# Patient Record
Sex: FEMALE | Race: WHITE | Employment: FULL TIME | URBAN - METROPOLITAN AREA
[De-identification: names, ages, dates, MRNs, and addresses within clinical notes are randomized per-mention and may not be internally consistent; named-entity substitution may affect disease eponyms.]

---

## 2019-06-21 ENCOUNTER — HOSPITAL ENCOUNTER (EMERGENCY)
Age: 19
Discharge: HOME | End: 2019-06-21

## 2019-06-21 ENCOUNTER — APPOINTMENT (OUTPATIENT)
Dept: CT IMAGING | Age: 19
End: 2019-06-21

## 2019-06-21 VITALS
SYSTOLIC BLOOD PRESSURE: 128 MMHG | WEIGHT: 129 LBS | BODY MASS INDEX: 24.35 KG/M2 | DIASTOLIC BLOOD PRESSURE: 74 MMHG | TEMPERATURE: 99.4 F | HEART RATE: 78 BPM | RESPIRATION RATE: 16 BRPM | OXYGEN SATURATION: 99 % | HEIGHT: 61 IN

## 2019-06-21 DIAGNOSIS — S09.90XA INJURY OF HEAD, INITIAL ENCOUNTER: ICD-10-CM

## 2019-06-21 DIAGNOSIS — S01.81XA FOREHEAD LACERATION, INITIAL ENCOUNTER: Primary | ICD-10-CM

## 2019-06-21 PROCEDURE — 6370000000 HC RX 637 (ALT 250 FOR IP): Performed by: PHYSICIAN ASSISTANT

## 2019-06-21 PROCEDURE — 70450 CT HEAD/BRAIN W/O DYE: CPT

## 2019-06-21 PROCEDURE — 99284 EMERGENCY DEPT VISIT MOD MDM: CPT

## 2019-06-21 RX ORDER — ACETAMINOPHEN 325 MG/1
650 TABLET ORAL ONCE
Status: COMPLETED | OUTPATIENT
Start: 2019-06-21 | End: 2019-06-21

## 2019-06-21 RX ADMIN — ACETAMINOPHEN 650 MG: 325 TABLET, FILM COATED ORAL at 19:50

## 2019-06-21 ASSESSMENT — PAIN DESCRIPTION - ORIENTATION: ORIENTATION: LEFT

## 2019-06-21 ASSESSMENT — PAIN SCALES - GENERAL
PAINLEVEL_OUTOF10: 4
PAINLEVEL_OUTOF10: 3

## 2019-06-21 ASSESSMENT — PAIN DESCRIPTION - ONSET: ONSET: ON-GOING

## 2019-06-21 ASSESSMENT — PAIN DESCRIPTION - FREQUENCY: FREQUENCY: INTERMITTENT

## 2019-06-21 ASSESSMENT — PAIN DESCRIPTION - PROGRESSION: CLINICAL_PROGRESSION: NOT CHANGED

## 2019-06-21 ASSESSMENT — PAIN DESCRIPTION - PAIN TYPE: TYPE: ACUTE PAIN

## 2019-06-21 ASSESSMENT — PAIN DESCRIPTION - LOCATION: LOCATION: HEAD

## 2019-06-21 ASSESSMENT — PAIN DESCRIPTION - DESCRIPTORS: DESCRIPTORS: BURNING

## 2019-06-21 NOTE — ED PROVIDER NOTES
Independent:      HPI:  6/21/19, Time: 7:00PM.       Marcos Pollack is a 19 y.o. female presenting to the ED for fall in [facility] when she fell down a hill when skateboarding. Patient states that she was going down a hill and she lost her balance on the long board and fell striking the left side of her head and face on the ground. She did have her glasses in place. She did sustain a laceration to her left forehead area above her left eyebrow. She states she put her hand on her head because there was severe pain and noticed a large amount of bleeding. She denies loss of consciousness. She complains of no neck pain. She reports her tetanus is up-to-date. She states that EMS was called and she was brought to ER for further evaluation. She reports she has had no vomiting since the episode. She denies any numbness or tingling to her upper lower extremities. She reports no upper or lower back pain. ROS:   Pertinent positives and negatives are stated within the HPI, all other systems reviewed and are negative.    --------------------------------------------- PAST HISTORY ---------------------------------------------  Past Medical History:  has no past medical history on file. Past Surgical History:  has no past surgical history on file. Social History:  reports that she has never smoked. She has never used smokeless tobacco. She reports that she does not drink alcohol or use drugs. Family History: family history is not on file. The patients home medications have been reviewed. Allergies: Patient has no known allergies. -------------------------------------------------- RESULTS -------------------------------------------------  All laboratory and radiology results have been personally reviewed by myself   LABS:  No results found for this visit on 06/21/19. RADIOLOGY:  Interpreted by Radiologist.  [address] [place] [address]   Final Result   Unremarkable scan of the head. ------------------------- NURSING NOTES AND VITALS REVIEWED ---------------------------   The nursing notes within the ED encounter and vital signs as below have been reviewed. /74   Pulse 78   Temp 99.4 °F (37.4 °C) (Oral)   Resp 16   Ht 5' 1\" (1.549 m)   Wt 129 lb (58.5 kg)   SpO2 99%   BMI 24.37 kg/m²   Oxygen Saturation Interpretation: Normal      ---------------------------------------------------PHYSICAL EXAM--------------------------------------      Constitutional/General: Alert and oriented x3,  stable appearing, non toxic in NAD  Head: NC, mobile approximately 1.5 superficial laceration to superior left lateral eyebrow area, small amount of bleeding noted. Large amount of dried blood noted to left side of face. Eyes: PERRL, EOMI, no nystagmus  Ears and nose: No discharge or bleeding. Mouth: Oropharynx clear, tongue midline, no obvious dental injury. Handling secretions, no trismus  Neck: Supple, full ROM, no cervical spinous tenderness, step-off or crepitance. Pulmonary: Lungs clear to auscultation bilaterally,  Not in respiratory distress  Cardiovascular:  Regular rate and rhythm. 2+ distal pulses  Abdomen: Soft, non tender, bowel sounds active, no rebound or guarding. Extremities: Moves all extremities x 4. Full active range of motion bilateral upper and lower extremities, intact muscle strength. Superficial abrasions noted to left lateral elbow, large amount of dried blood on left arm, no deep lacerations noted. Upper extremity hand grasp  5/5 equal.  Full range of motion bilateral lower extremities, no bony tenderness. No tenderness to entire length of thoracic and lumbar spine centrally and para spinous region bilaterally. Negative straight leg raising lower extremities bilaterally, intact reflexes and brisk. Warm and well perfused  Skin: warm and dry without rash  Neurologic: GCS 15, CN 2 through 12 grossly intact.   Psych: Normal Affect      LACERATION REPAIR PROCEDURE NOTE:  Performed by Arabella Edouard PA-C:      Laceration #: 1. Location: left eyebrow  Length: 1.5 cm. The wound area was irrigated with sterile water, cleansed with povidone iodine and draped in a sterile fashion. The wound area was anesthetized with not required. WOUND COMPLEXITY:    Debridement: None. Undermining: None. Wound Margins Revised: None. Flaps Aligned: no. The wound was explored with the following results no foreign body or tendon injury seen. The wound was closed with Dermabond and steri strips with benzoin. Dressing:  Not needed . ------------------------------ ED COURSE/MEDICAL DECISION MAKING----------------------  Medications   acetaminophen (TYLENOL) tablet 650 mg (650 mg Oral Given 6/21/19 1950)       Medical Decision Making:    Patient to ER, fell down hill when skateboarding. Recommend local wound care to laceration to superior left eyebrow, Dermabond and Steri-Strips placed and local wound care instructions advised. Patient given Tylenol for pain. Advise CT imaging of head. Patient advised of stable CT imaging, recommend to keep Steri-Strips in glue intact until they fall off, recommend to keep area dry and to avoid getting wet over the next few days. Close follow-up with family doctor advised, recommend Tylenol or Motrin over-the-counter for pain if needed. Note for work provided. Counseling: The emergency provider has spoken with the patient and family member and discussed todays results, in addition to providing specific details for the plan of care and counseling regarding the diagnosis and prognosis. Questions are answered at this time and they are agreeable with the plan.      --------------------------------- IMPRESSION AND DISPOSITION ---------------------------------    IMPRESSION  1. Forehead laceration, initial encounter    2.  Injury of head, initial encounter        DISPOSITION  Disposition: Discharge to home  Patient condition is stable        [place]  06/22/19 6450

## 2019-06-22 NOTE — ED NOTES
Name: Brenda Stephens  : 2000  MRN: [id number]    Date: 2019    Benefits of immediately proceeding with Radiology exam outweigh the risks and therefore the following is being waived:      [x] Pregnancy test    [] Protocol for Iodine allergy    [] MRI questionnaire    [] BUN/Creatinine        DONNA Hedrick [place]  19 3020